# Patient Record
Sex: FEMALE | Race: WHITE
[De-identification: names, ages, dates, MRNs, and addresses within clinical notes are randomized per-mention and may not be internally consistent; named-entity substitution may affect disease eponyms.]

---

## 2017-11-28 ENCOUNTER — HOSPITAL ENCOUNTER (OUTPATIENT)
Dept: HOSPITAL 47 - RADMAMWWP | Age: 67
Discharge: HOME | End: 2017-11-28
Payer: MEDICARE

## 2017-11-28 DIAGNOSIS — Z78.0: ICD-10-CM

## 2017-11-28 DIAGNOSIS — Z12.31: Primary | ICD-10-CM

## 2017-11-28 PROCEDURE — 77080 DXA BONE DENSITY AXIAL: CPT

## 2017-11-28 PROCEDURE — 77063 BREAST TOMOSYNTHESIS BI: CPT

## 2017-11-29 NOTE — BD
EXAMINATION TYPE: MG DEXA axial skeleton.  

 

DATE OF EXAM: 11/28/2017

 

COMPARISON: 11/12/2015 DEXA bone scan

 

CLINICAL HISTORY: Postmenopausal female

 

Height:  61.5 IN

Weight:  182 LBS

 

FRAX RISK QUESTIONS:

Alcohol (3 or more units per day):  NO

Family History (Parent hip fracture):  NO

Glucocorticoids (More than 3mos):  NO

           (Ex: prednisone, prednisolone, methylprednisolone, dexamethasone, and hydrocortisone).    
     

History of Fracture in Adulthood: NO

Secondary Osteoporosis:

  1.  Type 1 Diabetes: NO

  2.  Hyperthyroidism: NO

  3.  Menopause before 45: NO AGE 46

  4.  Malnutrition: NO

  5.  Chronic liver disease: NO

Rheumatoid Arthritis: NO

Current Tobacco Use: NO

 

RISK FACTORS 

HISTORY OF: 

Family History of Osteoporosis: YES MOTHER

Active: YES

Postmenopausal woman: AGE 46

Lost more than 2 inches in height since high school: YES 2 1/2"

 

MEDICATIONS: 

Osteoporosis Medications: YES

Which medication:  Fosamax EVISTA  

How Long: 15 YEARS

Additional Medications: MULTI VIT, FOSAMAX, LEXAPRO, HIGH BLOOD PRESSURE MED,  

 

 

 

EXAM MEASUREMENTS: 

Bone mineral densitometry was performed using the [employer] System.

Bone mineral density as measured about the Lumbar spine is:  

----- L1-L4(G/cm2): 1.067

T Score Values are as follows:

----- L2: -1.0

----- L3: -0.8

----- L4: -1.9

----- L1-L4: -0.9

Bone mineral density has: Increased 5.6% since study of: 11/12/2015

 

Bone mineral density about the R hip (g/cm2): 0.822

Bone mineral density about the L hip (g/cm2): 0.801

T Score values are as follows:

-----R Neck: -1.7

-----L Neck: -1.6

-----R Total: -1.1

-----L Total: -0.7

Bone mineral density has: Increased 0.1% since study of: 11/12/2015

 

 

 

IMPRESSION:

Osteopenia (T Score between -2.5 and -1 as noted by T score values persists in both hips though bone 
density is slightly increased from prior. There remains slightly increased risk of fracture and the p
atient may be considered for treatment. Re-Screen 2-5 years.

 

NOTE:  T-SCORE=SD OF THE YOUNG ADULT MEAN.

## 2017-11-29 NOTE — MM
Reason for exam: screening  (asymptomatic).

Last mammogram was performed 1 year ago.



History:

Patient is postmenopausal.

Family history of breast cancer in sister at age 51 and breast cancer in sister at

age 65.



Physical Findings:

A clinical breast exam by your physician is recommended on an annual basis and 

results should be correlated with mammographic findings.



MG 3D Screening Mammo W/Cad

Bilateral CC and MLO view(s) were taken.

Prior study comparison: November 17, 2016, bilateral MG 3d screening mammo w/cad. 

November 12, 2015, bilateral MG screening mammo w CAD.

The breast tissue is almost entirely fat.

Finding: There are few typically benign round, linear calcifications in both 

breasts, greater in the left breast. There is no discrete abnormality.





ASSESSMENT: Benign, BI-RAD 2



RECOMMENDATION:

Routine screening mammogram of both breasts in 1 year.

## 2018-12-27 ENCOUNTER — HOSPITAL ENCOUNTER (OUTPATIENT)
Dept: HOSPITAL 47 - RADMAMWWP | Age: 68
Discharge: HOME | End: 2018-12-27
Attending: FAMILY MEDICINE
Payer: MEDICARE

## 2018-12-27 DIAGNOSIS — Z12.31: Primary | ICD-10-CM

## 2018-12-27 PROCEDURE — 77063 BREAST TOMOSYNTHESIS BI: CPT

## 2018-12-27 PROCEDURE — 77067 SCR MAMMO BI INCL CAD: CPT

## 2018-12-28 NOTE — MM
Reason for exam: screening  (asymptomatic).

Last mammogram was performed 1 year and 1 month ago.



History:

Patient is postmenopausal.

Family history of breast cancer in sister at age 51 and breast cancer in sister at

age 65.



Physical Findings:

A clinical breast exam by your physician is recommended on an annual basis and 

results should be correlated with mammographic findings.



MG 3D Screening Mammo W/Cad

Bilateral CC and MLO view(s) were taken.  XCCL view(s) were taken of the right 

breast.

Prior study comparison: November 28, 2017, bilateral MG 3d screening mammo w/cad. 

November 17, 2016, bilateral MG 3d screening mammo w/cad.

There are scattered fibroglandular densities.  There are benign appearing round 

linear calcifications bilaterally. There is no discrete abnormality.





ASSESSMENT: Benign, BI-RAD 2



RECOMMENDATION:

Routine screening mammogram of both breasts in 1 year.

## 2020-01-10 ENCOUNTER — HOSPITAL ENCOUNTER (OUTPATIENT)
Dept: HOSPITAL 47 - RADMAMWWP | Age: 70
Discharge: HOME | End: 2020-01-10
Attending: INTERNAL MEDICINE
Payer: MEDICARE

## 2020-01-10 DIAGNOSIS — Z12.39: Primary | ICD-10-CM

## 2020-01-10 PROCEDURE — 77063 BREAST TOMOSYNTHESIS BI: CPT

## 2020-01-10 PROCEDURE — 77067 SCR MAMMO BI INCL CAD: CPT

## 2020-01-10 NOTE — MM
Reason for exam: screening  (asymptomatic).

Last mammogram was performed 1 year ago.



History:

Patient is postmenopausal and history of other cancer.

Family history of breast cancer in sister at age 51 and breast cancer in sister at

age 65.

Took hormonal contraceptives for 2 months.



Physical Findings:

A clinical breast exam by your physician is recommended on an annual basis and 

results should be correlated with mammographic findings.



MG 3D Screening Mammo W/Cad

Bilateral CC and MLO view(s) were taken.

Prior study comparison: December 27, 2018, bilateral MG 3d screening mammo w/cad. 

November 28, 2017, bilateral MG 3d screening mammo w/cad.

There are scattered fibroglandular densities.  There are benign appearing round 

linear calcifications bilaterally. There is no discrete abnormality.





ASSESSMENT: Benign, BI-RAD 2



RECOMMENDATION:

Routine screening mammogram of both breasts in 1 year.

## 2021-02-23 ENCOUNTER — HOSPITAL ENCOUNTER (OUTPATIENT)
Dept: HOSPITAL 47 - RADMAMWWP | Age: 71
Discharge: HOME | End: 2021-02-23
Attending: INTERNAL MEDICINE
Payer: MEDICARE

## 2021-02-23 DIAGNOSIS — Z12.31: Primary | ICD-10-CM

## 2021-02-23 PROCEDURE — 77067 SCR MAMMO BI INCL CAD: CPT

## 2021-02-23 PROCEDURE — 77063 BREAST TOMOSYNTHESIS BI: CPT

## 2021-02-24 NOTE — MM
Reason for exam: screening  (asymptomatic).

Last mammogram was performed 1 year and 1 month ago.



History:

Patient is postmenopausal and history of other cancer.

Family history of breast cancer in sister at age 51 and breast cancer in sister at

age 65.

Took hormonal contraceptives for 2 months.



Physical Findings:

A clinical breast exam by your physician is recommended on an annual basis and 

results should be correlated with mammographic findings.



MG 3D Screening Mammo W/Cad

Bilateral CC and MLO view(s) were taken.

Prior study comparison: January 10, 2020, bilateral MG 3d screening mammo w/cad.  

December 27, 2018, bilateral MG 3d screening mammo w/cad.

There are scattered fibroglandular densities.

Finding #1: There is a 4 mm lobulated mass in the upper outer quadrant of the left

breast.

Finding #2: There are typically benign calcifications in both breasts. There is a 

chronic nodularity bilaterally.





ASSESSMENT: Incomplete: need additional imaging evaluation, BI-RAD 0



RECOMMENDATION:

Special view mammogram of the left breast.



If lesion persists on supplemental views, image directed ultrasound is 

recommended.



[facility] will attempt to contact patient to return for supplemental 

views and ultrasound if indicated.

## 2021-02-25 ENCOUNTER — HOSPITAL ENCOUNTER (OUTPATIENT)
Dept: HOSPITAL 47 - RADMAMWWP | Age: 71
Discharge: HOME | End: 2021-02-25
Attending: INTERNAL MEDICINE
Payer: MEDICARE

## 2021-02-25 DIAGNOSIS — R92.8: Primary | ICD-10-CM

## 2021-02-25 PROCEDURE — 76642 ULTRASOUND BREAST LIMITED: CPT

## 2021-02-25 PROCEDURE — 77065 DX MAMMO INCL CAD UNI: CPT

## 2021-02-25 PROCEDURE — 77061 BREAST TOMOSYNTHESIS UNI: CPT

## 2021-02-25 NOTE — USB
Reason for exam: additional evaluation requested from abnormal screening.



History:

Patient is postmenopausal and history of other cancer.

Family history of breast cancer in sister at age 51 and breast cancer in sister at

age 65.

Took hormonal contraceptives for 2 months.



US Breast Workup Limited LT

Left limited breast ultrasound including focal area of concern, retroareolar and 

axilla demonstrates a 0.3 x 0.4 x 0.2cm oval, cystic lesion at 3 o'clock, a 2.8 x 

1.4 x 0.9cm oval lymph node at 3 o'clock and a 0.4 x 0.4 x 0.3cm oval, cystic 

lesion at 3 o'clock.



These results were verbally communicated with the patient and result sheet given 

to the patient on 2/25/21.





ASSESSMENT: Benign, BI-RAD 2



RECOMMENDATION:

Return to routine screening mammogram schedule for both breasts.

## 2021-02-25 NOTE — MM
Reason for exam: additional evaluation requested from abnormal screening.

Last mammogram was performed less than 1 month ago.



History:

Patient is postmenopausal and history of other cancer.

Family history of breast cancer in sister at age 51 and breast cancer in sister at

age 65.

Took hormonal contraceptives for 2 months.



Physical Findings:

Nurse did not find any significant physical abnormalities on exam.



MG 3D Work Up W/Cad LT

Spot compression CC, spot compression MLO, and ML view(s) were taken of the left 

breast.

Prior study comparison: February 23, 2021, bilateral MG 3d screening mammo w/cad. 

January 10, 2020, bilateral MG 3d screening mammo w/cad.

6mm nodule upper outer quadrant left breast 6.8cm from nipple.



These results were verbally communicated with the patient and result sheet given 

to the patient on 2/25/21.





ASSESSMENT: Incomplete: need additional imaging evaluation, BI-RAD 0



RECOMMENDATION:

Ultrasound of the left breast.

## 2021-04-28 ENCOUNTER — HOSPITAL ENCOUNTER (OUTPATIENT)
Dept: HOSPITAL 47 - ORWHC2ENDO | Age: 71
Discharge: HOME | End: 2021-04-28
Attending: INTERNAL MEDICINE
Payer: MEDICARE

## 2021-04-28 VITALS — HEART RATE: 77 BPM | RESPIRATION RATE: 16 BRPM | SYSTOLIC BLOOD PRESSURE: 118 MMHG | DIASTOLIC BLOOD PRESSURE: 74 MMHG

## 2021-04-28 VITALS — TEMPERATURE: 97.9 F

## 2021-04-28 VITALS — BODY MASS INDEX: 32.5 KG/M2

## 2021-04-28 DIAGNOSIS — I10: ICD-10-CM

## 2021-04-28 DIAGNOSIS — D12.8: Primary | ICD-10-CM

## 2021-04-28 DIAGNOSIS — G47.33: ICD-10-CM

## 2021-04-28 DIAGNOSIS — K21.9: ICD-10-CM

## 2021-04-28 DIAGNOSIS — Z79.899: ICD-10-CM

## 2021-04-28 PROCEDURE — 88305 TISSUE EXAM BY PATHOLOGIST: CPT

## 2021-04-28 PROCEDURE — 45385 COLONOSCOPY W/LESION REMOVAL: CPT

## 2021-04-28 NOTE — P.PCN
Date of Procedure: 04/28/21


Procedure(s) Performed: 


BRIEF HISTORY: Patient is a 70-year-old pleasant white female scheduled for an 

elective colonoscopy as a part of evaluation of positive cologuard.





PROCEDURE PERFORMED: Colonoscopy with snare polypectomy. 





PREOPERATIVE DIAGNOSIS: Positive cologuard. 





IV sedation per Anesthesia. 





PROCEDURE: After informed consent was obtained, the patient, was brought into 

the endoscopy unit. IV sedation was administered by Anesthesia under continuous 

monitoring.  Digital rectal examination was normal. Initially the Olympus CF-160

flexible video colonoscope was then inserted in the rectum, gradually advanced 

into the cecum without any difficulty. Careful examination was performed as the 

scope was gradually being withdrawn. Ileocecal valve and the appendiceal orifice

were visualized and appeared normal.  Prep was excellent. Mucosa of the cecum, 

ascending colon, transverse colon, descending colon, sigmoid colon,  appeared 

normal.  In the proximal rectum there was a 1 cm polyp that was removed by snare

polypectomy.  Retroflexion was performed in the rectum and no lesions were seen.

The patient tolerated the procedure well. 





IMPRESSION: 


1 cm proximal rectal polyp status post polypectomy


Rest of the colon appeared normal





RECOMMENDATIONS:  Findings of this examination were discussed with the patient 

as well as a family.  She was advised to follow with the biopsy results and have

a repeat colonoscopy in 3 years.

## 2022-05-05 ENCOUNTER — HOSPITAL ENCOUNTER (OUTPATIENT)
Dept: HOSPITAL 47 - RADMAMWWP | Age: 72
Discharge: HOME | End: 2022-05-05
Attending: INTERNAL MEDICINE
Payer: MEDICARE

## 2022-05-05 DIAGNOSIS — Z80.3: ICD-10-CM

## 2022-05-05 DIAGNOSIS — Z12.31: Primary | ICD-10-CM

## 2022-05-05 DIAGNOSIS — Z78.0: ICD-10-CM

## 2022-05-05 PROCEDURE — 77063 BREAST TOMOSYNTHESIS BI: CPT

## 2022-05-05 PROCEDURE — 77067 SCR MAMMO BI INCL CAD: CPT

## 2022-05-06 NOTE — MM
Reason for exam: screening  (asymptomatic).

Last mammogram was performed 1 year and 2 months ago.



History:

Patient is postmenopausal and history of other cancer.

Family history of breast cancer in sister at age 51 and breast cancer in sister at

age 65.

Took hormonal contraceptives for 2 months.



Physical Findings:

A clinical breast exam by your physician is recommended on an annual basis and 

results should be correlated with mammographic findings.



MG 3D Screening Mammo W/Cad

Bilateral CC and MLO view(s) were taken.

Prior study comparison: February 25, 2021, left breast MG 3d work up w/cad LT.  

February 23, 2021, bilateral MG 3d screening mammo w/cad.

There are scattered fibroglandular densities.  Stable benign calcifications. There

is no discrete abnormality.  No significant changes when compared with prior 

studies.





ASSESSMENT: Benign, BI-RAD 2



RECOMMENDATION:

Routine screening mammogram of both breasts in 1 year.

## 2022-06-15 ENCOUNTER — HOSPITAL ENCOUNTER (OUTPATIENT)
Dept: HOSPITAL 47 - LABWHC1 | Age: 72
Discharge: HOME | End: 2022-06-15
Attending: INTERNAL MEDICINE
Payer: MEDICARE

## 2022-06-15 DIAGNOSIS — Z86.16: ICD-10-CM

## 2022-06-15 DIAGNOSIS — E78.5: Primary | ICD-10-CM

## 2022-06-15 DIAGNOSIS — E03.9: ICD-10-CM

## 2022-06-15 DIAGNOSIS — R73.9: ICD-10-CM

## 2022-06-15 LAB
ALBUMIN SERPL-MCNC: 4.4 G/DL (ref 3.8–4.9)
ALBUMIN/GLOB SERPL: 1.52 G/DL (ref 1.6–3.17)
ALP SERPL-CCNC: 74 U/L (ref 41–126)
ALT SERPL-CCNC: 23 U/L (ref 8–44)
ANION GAP SERPL CALC-SCNC: 20.1 MMOL/L (ref 10–18)
AST SERPL-CCNC: 30 U/L (ref 13–35)
BASOPHILS # BLD AUTO: 0.04 X 10*3/UL (ref 0–0.1)
BASOPHILS NFR BLD AUTO: 0.7 %
BUN SERPL-SCNC: 19.4 MG/DL (ref 9–27)
BUN/CREAT SERPL: 25.46 RATIO (ref 12–20)
CALCIUM SPEC-MCNC: 9.8 MG/DL (ref 8.7–10.3)
CHLORIDE SERPL-SCNC: 97 MMOL/L (ref 96–109)
CHOLEST SERPL-MCNC: 190 MG/DL (ref 0–200)
CO2 SERPL-SCNC: 28.6 MMOL/L (ref 20–27.5)
EOSINOPHIL # BLD AUTO: 0.11 X 10*3/UL (ref 0.04–0.35)
EOSINOPHIL NFR BLD AUTO: 2 %
ERYTHROCYTE [DISTWIDTH] IN BLOOD BY AUTOMATED COUNT: 4.44 X 10*6/UL (ref 4.1–5.2)
ERYTHROCYTE [DISTWIDTH] IN BLOOD: 14.6 % (ref 11.5–14.5)
GLOBULIN SER CALC-MCNC: 2.9 G/DL (ref 1.6–3.3)
GLUCOSE SERPL-MCNC: 103 MG/DL (ref 70–110)
HCT VFR BLD AUTO: 41.7 % (ref 37.2–46.3)
HDLC SERPL-MCNC: 82.4 MG/DL (ref 40–60)
HGB BLD-MCNC: 13.2 G/DL (ref 12–15)
IMM GRANULOCYTES BLD QL AUTO: 0.4 %
LDLC SERPL CALC-MCNC: 96.5 MG/DL (ref 0–131)
LYMPHOCYTES # SPEC AUTO: 1.5 X 10*3/UL (ref 0.9–5)
LYMPHOCYTES NFR SPEC AUTO: 27.9 %
MCH RBC QN AUTO: 29.7 PG (ref 27–32)
MCHC RBC AUTO-ENTMCNC: 31.7 G/DL (ref 32–37)
MCV RBC AUTO: 93.9 FL (ref 80–97)
MONOCYTES # BLD AUTO: 0.54 X 10*3/UL (ref 0.2–1)
MONOCYTES NFR BLD AUTO: 10.1 %
NEUTROPHILS # BLD AUTO: 3.16 X 10*3/UL (ref 1.8–7.7)
NEUTROPHILS NFR BLD AUTO: 58.9 %
NRBC BLD AUTO-RTO: 0 /100 WBCS (ref 0–0)
PLATELET # BLD AUTO: 315 X 10*3/UL (ref 140–440)
POTASSIUM SERPL-SCNC: 3.3 MMOL/L (ref 3.5–5.5)
PROT SERPL-MCNC: 7.3 G/DL (ref 6.2–8.2)
SODIUM SERPL-SCNC: 145 MMOL/L (ref 135–145)
TRIGL SERPL-MCNC: 55.7 MG/DL (ref 0–149)
VLDLC SERPL CALC-MCNC: 11.14 MG/DL (ref 5–40)
WBC # BLD AUTO: 5.37 X 10*3/UL (ref 4.5–10)

## 2022-06-15 PROCEDURE — 84443 ASSAY THYROID STIM HORMONE: CPT

## 2022-06-15 PROCEDURE — 80061 LIPID PANEL: CPT

## 2022-06-15 PROCEDURE — 85025 COMPLETE CBC W/AUTO DIFF WBC: CPT

## 2022-06-15 PROCEDURE — 83036 HEMOGLOBIN GLYCOSYLATED A1C: CPT

## 2022-06-15 PROCEDURE — 36415 COLL VENOUS BLD VENIPUNCTURE: CPT

## 2022-06-15 PROCEDURE — 80053 COMPREHEN METABOLIC PANEL: CPT

## 2022-08-09 ENCOUNTER — HOSPITAL ENCOUNTER (OUTPATIENT)
Dept: HOSPITAL 47 - RADBDWWP | Age: 72
Discharge: HOME | End: 2022-08-09
Attending: INTERNAL MEDICINE
Payer: MEDICARE

## 2022-08-09 DIAGNOSIS — M85.89: Primary | ICD-10-CM

## 2022-08-09 PROCEDURE — 77080 DXA BONE DENSITY AXIAL: CPT

## 2022-08-09 NOTE — BD
EXAMINATION TYPE: Axial Bone Density

 

DATE OF EXAM: 8/9/2022

 

CLINICAL HISTORY: 71 years year old Female.  ICD-10 CODE: OSTEOPOROSIS M81.0

 

Height: 61

Weight:  159.5

 

FRAX RISK QUESTIONS:

Alcohol (3 or more units per day):  NO

Family History (Parent hip fracture):  NO

Glucocorticoids (More than 3mos):  NO

History of Fracture in Adulthood: NO

 

Secondary Osteoporosis:

  1.  Type 1 Diabetes: NO

  2.  Hyperthyroidism: NO

  3.  Menopause before 45: YES

  4.  Malnutrition: NO

  5.  Chronic liver disease: NO

Rheumatoid Arthritis: NO

Current Tobacco Use: NO

 

RISK FACTORS 

HISTORY OF: 

Hip Fracture (Right/Left): NO

Spine Fracture: NO

History of Wrist Fracture: NOWhen:

Surgery to Spine/Hip(right/left)/Wrist (right/left): BILATERAL CARPAL TUNNEL

When: AGE 50

Family History of Osteoporosis: MOTHER, SISTER, MATERNAL GRANDMOTHER

Active: NO

Diet low in dairy products/other sources of calcium:  NO

Postmenopausal woman: YES

Take estrogen and/or progesterone medications: NO

Lost more than 2 inches in height since high school: YES

Frequent falls: NO

Poor Health: NO

Hyperparathyroidism: NO

Adrenal Insufficiency: NO

 

MEDICATIONS: 

Prednisone or other steroids: NO

Thyroid Medications: NO 

Osteoporosis Medications: FOSAMAX WEEKLY

How Long: PAST 2 MONTHS

Additional Medications: BP MEDS, VIT D, CALCIUM, REFLUX MEDS, METFORMIN, 

 

 

EXAM MEASUREMENTS: 

Bone mineral densitometry was performed using the [employer] System.

Bone mineral density as measured about the Lumbar spine is:  

----- L1-L4(G/cm2): 1.172

T Score Values are as follows:

----- L1: 0.1

----- L2: 0.2

----- L3: 0.2

----- L4-0.6

----- L1-L4: -0.1

Bone mineral density has: INCREASED 12.8 % since study of: 11/28/2017

 

Bone mineral density about the R hip (g/cm2): 0.825

Bone mineral density about the L hip (g/cm2): 0.854

T Score values are as follows:

-----R Neck: -1.5

-----L Neck: -1.3

-----R Total: -0.8

-----L Total: -0.4

Bone mineral density has: INCREASED 4.1 % since study of: 11/28/2017

 

FRAX%s: The graph provided illustrates a 10.3% chance for a major osteoporotic fx and a 1.7% chance f
or the hips probability for fx in 10 years time.

 

IMPRESSION:

Osteopenia (T Score between -2.5 and -1).

 

There is slightly increased risk of fracture and the patient may be considered 

for treatment. 

 

Re-Screen 2-5 years.

 

NOTE:  T-SCORE=SD OF THE YOUNG ADULT MEAN.

## 2023-05-10 ENCOUNTER — HOSPITAL ENCOUNTER (OUTPATIENT)
Dept: HOSPITAL 47 - RADMAMWWP | Age: 73
Discharge: HOME | End: 2023-05-10
Attending: INTERNAL MEDICINE
Payer: MEDICARE

## 2023-05-10 DIAGNOSIS — Z80.3: ICD-10-CM

## 2023-05-10 DIAGNOSIS — Z12.31: Primary | ICD-10-CM

## 2023-05-10 DIAGNOSIS — Z78.0: ICD-10-CM

## 2023-05-10 PROCEDURE — 77063 BREAST TOMOSYNTHESIS BI: CPT

## 2023-05-10 PROCEDURE — 77067 SCR MAMMO BI INCL CAD: CPT

## 2023-05-11 NOTE — MM
Reason for Exam: Screening  (asymptomatic). 

Last screening mammogram was performed 12 month(s) ago.





Patient History: 

Menarche at age 16. First Full-Term Pregnancy at age 24. Postmenopausal. Hormonal Contraceptives for

2 months.

Niece (Beata) had breast cancer, age 42. Sister (Nyla) had breast cancer, age 51. Sister (Xiomara)

had breast cancer, age 65. Sister (Anabela) had breast cancer, age 64. Sister (Nyla) tested for

BRCA1 outcome was negative. Sister (Xiomara) tested for BRCA1 outcome was negative. Sister (Anabela)

tested for BRCA1 outcome was negative. 





Risk Values: 

Soraya 5 year model risk: 6.5%.

NCI Lifetime model risk: 16.1%.





Prior Study Comparison: 

2/23/2021 Bilateral Screening Mammogram, [facility]. 2/25/2021 Left Diagnostic Mammogram, [facility]. 5/5/2022

Bilateral Screening Mammogram, [facility]. 





Tissue Density: 

The breast tissue is almost entirely fat.





Findings: 

Analyzed By CAD. 

There are scattered and loosely grouped benign-appearing linear calcifications throughout the

bilateral breasts. Benign appearing bilateral axillary lymph nodes redemonstrated.



There is no suspicious group of microcalcifications or new suspicious mass in either breast. 





Overall Assessment: Benign, BI-RAD 2





Management: 

Screening Mammogram of both breasts in 1 year.

.



Patient should continue monthly self-breast exams.  A clinical breast exam by your physician is

recommended on an annual basis.

This exam should not preclude additional follow-up of suspicious palpable abnormalities.



Note on Soraya scores and lifetime risk:

1. A Soraya score greater than 3% is considered moderate risk. If this is the case, consider

specialist referral to assess eligibility for a risk reducing agent.

2. If overall lifetime risk for the development of breast cancer is 20% or higher, the patient may

qualify for future screening with alternating mammogram and breast MRI.



Electronically signed and approved by: Jay Rojas M.D.

## 2024-05-13 ENCOUNTER — HOSPITAL ENCOUNTER (OUTPATIENT)
Dept: HOSPITAL 47 - RADMAMWWP | Age: 74
Discharge: HOME | End: 2024-05-13
Attending: INTERNAL MEDICINE
Payer: MEDICARE

## 2024-05-13 DIAGNOSIS — Z80.3: ICD-10-CM

## 2024-05-13 DIAGNOSIS — Z78.0: ICD-10-CM

## 2024-05-13 DIAGNOSIS — Z12.31: Primary | ICD-10-CM

## 2024-05-13 PROCEDURE — 77067 SCR MAMMO BI INCL CAD: CPT

## 2024-05-13 PROCEDURE — 77063 BREAST TOMOSYNTHESIS BI: CPT

## 2024-05-13 NOTE — MM
Reason for Exam: Screening  (asymptomatic). 

Last screening mammogram was performed 12 month(s) ago.





Patient History: 

Menarche at age 16. First Full-Term Pregnancy at age 24. Postmenopausal. Hormonal Contraceptives for

2 months.

Niece (Beata) had breast cancer, age 42. Sister (Nyla) had breast cancer, age 51. Sister (Xiomara)

had breast cancer, age 65. Sister (Anabela) had breast cancer, age 64. Sister (Nyla) tested for

BRCA1 outcome was negative. Sister (Xiomara) tested for BRCA1 outcome was negative. Sister (Anabela)

tested for BRCA1 outcome was negative. 





Risk Values: 

Soraya 5 year model risk: 6.5%.

NCI Lifetime model risk: 15.3%.





Prior Study Comparison: 

2/25/2021 Left Diagnostic Mammogram, [facility]. 5/5/2022 Bilateral Screening Mammogram, [facility]. 5/10/2023

Bilateral MG 3D screening mammo w/cad, [facility]. 





Tissue Density: 

There are scattered areas of fibroglandular density.





Findings: 

Analyzed By CAD. 

Benign bilateral secretory calcifications. Chronic nodularity anterior outer aspect of the right

breast.

There is no suspicious group of microcalcifications or new suspicious mass in either breast. 





Overall Assessment: Benign, BI-RAD 2





Management: 

Screening Mammogram of both breasts in 1 year.

See note below in regards to patient's increased 5 year Soraya score.



Patient should continue monthly self-breast exams.  A clinical breast exam by your physician is

recommended on an annual basis.

This exam should not preclude additional follow-up of suspicious palpable abnormalities.



Note on Soraya scores and lifetime risk:

1. A Soraya score greater than 3% is considered moderate risk. If this is the case, consider

specialist referral to assess eligibility for a risk reducing agent.

2. If overall lifetime risk for the development of breast cancer is 20% or higher, the patient may

qualify for future screening with alternating mammogram and breast MRI.



Electronically signed and approved by: Eric Oro M.D. Radiologist

## 2024-09-27 ENCOUNTER — HOSPITAL ENCOUNTER (OUTPATIENT)
Dept: HOSPITAL 47 - RADXRMAIN | Age: 74
Discharge: HOME | End: 2024-09-27
Attending: INTERNAL MEDICINE
Payer: MEDICARE

## 2024-09-27 PROCEDURE — 72100 X-RAY EXAM L-S SPINE 2/3 VWS: CPT

## 2024-09-27 PROCEDURE — 72072 X-RAY EXAM THORAC SPINE 3VWS: CPT

## 2024-09-27 NOTE — XR
EXAMINATION TYPE: XR thoracic spine complete

 

DATE OF EXAM: 9/27/2024

 

COMPARISON: NONE

 

HISTORY: Pain

 

TECHNIQUE: 3 views submitted

 

FINDINGS:

Alignment is anatomic.  There is no compression deformities. Diffuse osteopenia with multilevel moder
ate degenerative disc disease. Heart size is prominent and there likely is a large hiatal hernia. Mil
d superior endplate compression deformity of L1 indeterminate age.

 

IMPRESSION:

1. Mild superior endplate compression deformity of L1 of indeterminate age. Multilevel moderate degen
erative disc disease.

2. Suspect a large hiatal hernia.

 

[facility], Workstation: [id number], 9/27/2024 10:45 AM

## 2024-09-27 NOTE — XR
EXAM TYPE: LUMBAR SPINE X RAY SERIES

 

COMPARISON: NONE

 

HISTORY: Pain

 

TECHNIQUE: 4 views are submitted.

 

FINDINGS:

Arches bilaterally generalized osteopenia and multilevel degenerative disc disease with severe change
s L5-S1 and L1-L2. Moderate additional degenerative disc disease. Multilevel facet arthropathy with g
rade 1 anterolisthesis L4-L5. Pedicles intact. Mild superior endplate compression deformity L1 indete
rminate age.

 

IMPRESSION:

1. Diffuse osteopenia with multilevel moderate to severe degenerative disc disease and grade 1 ani
listhesis L4-L5. Suspect multilevel foraminal encroachment. 

2. Mild superior endplate compression deformity of L1 indeterminate age.

 

[facility], Workstation: [id number], 9/27/2024 10:46 AM

## 2025-06-13 ENCOUNTER — HOSPITAL ENCOUNTER (OUTPATIENT)
Dept: HOSPITAL 47 - RADMAMWWP | Age: 75
Discharge: HOME | End: 2025-06-13
Attending: INTERNAL MEDICINE
Payer: MEDICARE

## 2025-06-13 DIAGNOSIS — Z92.0: ICD-10-CM

## 2025-06-13 DIAGNOSIS — Z12.31: Primary | ICD-10-CM

## 2025-06-13 DIAGNOSIS — Z80.3: ICD-10-CM

## 2025-06-13 DIAGNOSIS — R92.323: ICD-10-CM

## 2025-06-13 DIAGNOSIS — R92.1: ICD-10-CM

## 2025-06-13 DIAGNOSIS — Z78.0: ICD-10-CM

## 2025-06-13 PROCEDURE — 77063 BREAST TOMOSYNTHESIS BI: CPT

## 2025-06-13 PROCEDURE — 77067 SCR MAMMO BI INCL CAD: CPT
